# Patient Record
Sex: FEMALE | Race: WHITE
[De-identification: names, ages, dates, MRNs, and addresses within clinical notes are randomized per-mention and may not be internally consistent; named-entity substitution may affect disease eponyms.]

---

## 2023-08-09 PROBLEM — Z00.00 ENCOUNTER FOR PREVENTIVE HEALTH EXAMINATION: Status: ACTIVE | Noted: 2023-08-09

## 2023-10-10 ENCOUNTER — APPOINTMENT (OUTPATIENT)
Dept: PLASTIC SURGERY | Facility: CLINIC | Age: 38
End: 2023-10-10
Payer: COMMERCIAL

## 2023-10-10 VITALS
RESPIRATION RATE: 14 BRPM | HEIGHT: 62 IN | SYSTOLIC BLOOD PRESSURE: 121 MMHG | HEART RATE: 85 BPM | BODY MASS INDEX: 31.47 KG/M2 | TEMPERATURE: 97.3 F | DIASTOLIC BLOOD PRESSURE: 78 MMHG | WEIGHT: 171 LBS | OXYGEN SATURATION: 96 %

## 2023-10-10 DIAGNOSIS — Z78.9 OTHER SPECIFIED HEALTH STATUS: ICD-10-CM

## 2023-10-10 DIAGNOSIS — T85.848A PAIN DUE TO OTHER INTERNAL PROSTHETIC DEVICES, IMPLANTS AND GRAFTS, INITIAL ENCOUNTER: ICD-10-CM

## 2023-10-10 DIAGNOSIS — T85.44XA CAPSULAR CONTRACTURE OF BREAST IMPLANT, INITIAL ENCOUNTER: ICD-10-CM

## 2023-10-10 PROCEDURE — 99204 OFFICE O/P NEW MOD 45 MIN: CPT

## 2023-10-10 RX ORDER — TRIAMCINOLONE ACETONIDE 55 MCG
AEROSOL WITH ADAPTER (GRAM) NASAL
Refills: 0 | Status: ACTIVE | COMMUNITY

## 2023-10-10 RX ORDER — SPIRONOLACTONE 50 MG/1
50 TABLET ORAL
Refills: 0 | Status: ACTIVE | COMMUNITY

## 2023-10-10 RX ORDER — FLUTICASONE FUROATE AND VILANTEROL TRIFENATATE 50; 25 UG/1; UG/1
POWDER RESPIRATORY (INHALATION)
Refills: 0 | Status: ACTIVE | COMMUNITY

## 2023-11-10 PROBLEM — T85.44XA CAPSULAR CONTRACTURE OF BREAST IMPLANT: Status: ACTIVE | Noted: 2023-11-10

## 2023-11-24 ENCOUNTER — APPOINTMENT (OUTPATIENT)
Dept: MRI IMAGING | Facility: CLINIC | Age: 38
End: 2023-11-24

## 2024-03-28 RX ORDER — OXYCODONE 5 MG/1
5 TABLET ORAL
Qty: 10 | Refills: 0 | Status: ACTIVE | COMMUNITY
Start: 2024-03-28 | End: 1900-01-01

## 2024-04-05 ENCOUNTER — RESULT REVIEW (OUTPATIENT)
Age: 39
End: 2024-04-05

## 2024-04-05 ENCOUNTER — APPOINTMENT (OUTPATIENT)
Dept: PLASTIC SURGERY | Facility: AMBULATORY SURGERY CENTER | Age: 39
End: 2024-04-05
Payer: COMMERCIAL

## 2024-04-05 PROCEDURE — 15734 MUSCLE-SKIN GRAFT TRUNK: CPT

## 2024-04-05 PROCEDURE — 19316 MASTOPEXY: CPT | Mod: 50

## 2024-04-05 PROCEDURE — 19371 PERI-IMPLT CAPSLC BRST COMPL: CPT | Mod: 50

## 2024-04-11 ENCOUNTER — APPOINTMENT (OUTPATIENT)
Dept: PLASTIC SURGERY | Facility: CLINIC | Age: 39
End: 2024-04-11
Payer: COMMERCIAL

## 2024-04-11 VITALS
DIASTOLIC BLOOD PRESSURE: 68 MMHG | SYSTOLIC BLOOD PRESSURE: 113 MMHG | TEMPERATURE: 98.1 F | OXYGEN SATURATION: 100 % | HEART RATE: 69 BPM

## 2024-04-11 PROCEDURE — 99024 POSTOP FOLLOW-UP VISIT: CPT

## 2024-04-11 NOTE — REASON FOR VISIT
Valeri is a 35 year old female is unaccompanied today.  Presenting with sore throat that has intensified.     Symptoms started 7 weeks ago    OTC Medications used: none    Abx. used : no    Work/School Note? yes    Denies  known Latex allergy or symptoms of Latex sensitivity.    ALLERGIES:  No Known Allergies    Medications: medications verified and updated    All allergies and medications reviewed.    PCP verified:  [email or web address]    Pharmacy verified:  [facility]    Patient would like communication of their results via:      Home Phone: [phone] (home)  Okay to leave a message containing results? No    Cell Phone:   Telephone Information:   Mobile [phone]     Okay to leave a message containing results? Yes    Nursing staff performed/collected the following; none                             [Follow-Up: _____] : a [unfilled] follow-up visit [FreeTextEntry1] : s/p bilateral breast implant removal with breast lift 4/5

## 2024-04-11 NOTE — ASSESSMENT
[FreeTextEntry1] : Ms. SANTHOSH WYNNE is a 39 year old female s/p bilateral breast implant removal and lift   Doing well Drains removed today without difficulties monitor for redness, swelling, fever, chills no heavy lifting or strenuous activity continue to wear soft, non wire bra she is encouraged to call if there are any changes RTO 2 weeks all pt questions answered

## 2024-04-11 NOTE — PHYSICAL EXAM
[NI] : Normal [de-identified] : BL breasts soft and symmetrical with overlying erythema  incisions c/d/i drains in place and functioning no signs of infection or palpable fluid collections

## 2024-04-25 ENCOUNTER — APPOINTMENT (OUTPATIENT)
Dept: PLASTIC SURGERY | Facility: CLINIC | Age: 39
End: 2024-04-25
Payer: COMMERCIAL

## 2024-04-25 PROCEDURE — 99024 POSTOP FOLLOW-UP VISIT: CPT

## 2024-04-25 NOTE — ASSESSMENT
[FreeTextEntry1] : Ms. SANTHOSH WYNNE is a 39 year old female s/p bilateral breast implant removal and lift  Doing well BL T point sutures removed today without difficulties monitor for redness, swelling, fever, chills no heavy lifting or strenuous activity continue to wear soft, non wire bra she is encouraged to call if there are any changes RTO 3 weeks all pt questions answered.

## 2024-04-25 NOTE — HISTORY OF PRESENT ILLNESS
[FreeTextEntry1] : Ms. 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 She  complains of bilateral breast pain associated with her  breast implants. She  is unable to sleep on her  stomach due to severe pain associated with the breast implants.  She  is also unable to do activities of daily life like running, biking, raising her arms above her head or out to the side, and lifting.  She states that she underwent breast augmentation in 2014 with Dr. Russell  and this brought her up to a 38 C  cup.  She had silicone, smooth implants placed, 304 cc on left and 234 cc on the right, submuscular. In 2015 she began to feel systemic complaints which includes brain fog, joint pain, fatigue and lethargy.    She is now s/p bilateral breast implant removal 4/5 Doing well

## 2024-04-25 NOTE — REASON FOR VISIT
[Follow-Up: _____] : a [unfilled] follow-up visit [FreeTextEntry1] : s/p bilateral breast implant removal with breast lift 4/5.

## 2024-04-25 NOTE — PHYSICAL EXAM
[NI] : Normal [de-identified] : BL breasts soft and symmetrical with overlying erythema  incisions c/d/i BL sutures in place  no signs of infection or palpable fluid collections

## 2024-05-16 ENCOUNTER — APPOINTMENT (OUTPATIENT)
Dept: PLASTIC SURGERY | Facility: CLINIC | Age: 39
End: 2024-05-16
Payer: COMMERCIAL

## 2024-05-16 VITALS
TEMPERATURE: 97.9 F | OXYGEN SATURATION: 99 % | HEART RATE: 76 BPM | SYSTOLIC BLOOD PRESSURE: 126 MMHG | DIASTOLIC BLOOD PRESSURE: 71 MMHG

## 2024-05-16 DIAGNOSIS — Z98.86 PERSONAL HISTORY OF BREAST IMPLANT REMOVAL: ICD-10-CM

## 2024-05-16 PROCEDURE — 99024 POSTOP FOLLOW-UP VISIT: CPT

## 2024-05-16 NOTE — HISTORY OF PRESENT ILLNESS
[FreeTextEntry1] : Ms. 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 She  complains of bilateral breast pain associated with her  breast implants. She  is unable to sleep on her  stomach due to severe pain associated with the breast implants.   She is now s/p b/l breast implant removal, capsulectomy, mastopexy 4/5/24 Doing well she presents with her   no complaints at this time.

## 2024-05-16 NOTE — ASSESSMENT
[FreeTextEntry1] : 38 yo female s/p b/l breast implant removal, capsulectomy, mastopexy 4/5/24 doing well discussed scar massages and sun screen at today's visit prenio removed without difficulty monitor for redness, swelling, fever, chills no restrictions she is encouraged to call if there are any changes all pt questions answered

## 2024-05-16 NOTE — PHYSICAL EXAM
[NI] : Normal [de-identified] : b/l breasts soft and symmetrical nipples viable incisions c/d/i no palpable fluid collections or signs of infection

## 2024-09-19 ENCOUNTER — APPOINTMENT (OUTPATIENT)
Dept: PLASTIC SURGERY | Facility: CLINIC | Age: 39
End: 2024-09-19

## 2024-09-19 DIAGNOSIS — Z98.86 PERSONAL HISTORY OF BREAST IMPLANT REMOVAL: ICD-10-CM

## 2024-09-19 PROCEDURE — 99214 OFFICE O/P EST MOD 30 MIN: CPT

## 2024-09-19 NOTE — HISTORY OF PRESENT ILLNESS
[FreeTextEntry1] : Ms. 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 She  complains of bilateral breast pain associated with her  breast implants. She  is unable to sleep on her  stomach due to severe pain associated with the breast implants.  She  is also unable to do activities of daily life like running, biking, raising her arms above her head or out to the side, and lifting.  She states that she underwent breast augmentation in 2014 with Dr. Russell  and this brought her up to a 38 C  cup.  She had silicone, smooth implants placed, 304 cc on left and 234 cc on the right, submuscular. In 2015 she began to feel systemic complaints which includes brain fog, joint pain, fatigue and lethargy.    She is now s/p bilateral breast implant removal 4/5 Doing well Admits to having decreased volume towards the top of her breast with dark scarring to her vertical incisions  n/a

## 2024-09-19 NOTE — PHYSICAL EXAM
[NI] : Normal [de-identified] : BL breasts soft and symmetrical with overlying erythema  incisions c/d/i no signs of infection or palpable fluid collections  decreased volume superiorly dark scarring to bilateral vertical limbs - retained areolar pigment bilaterally

## 2024-09-19 NOTE — ASSESSMENT
[FreeTextEntry1] : 40 yo female s/p b/l breast implant removal, capsulectomy, mastopexy 4/5/24  doing well discussed continuing scar massages and sun screen at today's visit Revision options discussed with patient today, which includes bilateral breast scar revision with removal of areolar tissue, and bilateral breast fat grafting to restore lost volume and deformity created by years of breast implant insertion.    I discussed the risks, benefits, and alternatives including the risks of infection, bleeding, seroma, hematoma, asymmetry, nipple necrosis, change/loss of nipple sensation, contour irregularity, breast skin necrosis, delayed wound healing, need for more surgery.  The patient understands these risks, all questions were answered and the pt wishes to proceed with surgery.  monitor for redness, swelling, fever, chills no restrictions she is encouraged to call if there are any changes all pt questions answered.

## 2024-11-26 ENCOUNTER — APPOINTMENT (OUTPATIENT)
Dept: PLASTIC SURGERY | Facility: CLINIC | Age: 39
End: 2024-11-26

## 2025-06-03 NOTE — HISTORY OF PRESENT ILLNESS
[FreeTextEntry1] : Ms. 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 She  complains of bilateral breast pain associated with her  breast implants. She  is unable to sleep on her  stomach due to severe pain associated with the breast implants.  She  is also unable to do activities of daily life like running, biking, raising her arms above her head or out to the side, and lifting.  She states that she underwent breast augmentation in 2014 with Dr. Russell  and this brought her up to a 38 C  cup.  She had silicone, smooth implants placed, 304 cc on left and 234 cc on the right, submuscular. In 2015 she began to feel systemic complaints which includes brain fog, joint pain, fatigue and lethargy.    She is now s/p bilateral breast implant removal 4/5 Doing well Drains < 30 cc bilaterally x 2 days without difficulty